# Patient Record
Sex: FEMALE | Race: WHITE | NOT HISPANIC OR LATINO | Employment: UNEMPLOYED | URBAN - METROPOLITAN AREA
[De-identification: names, ages, dates, MRNs, and addresses within clinical notes are randomized per-mention and may not be internally consistent; named-entity substitution may affect disease eponyms.]

---

## 2019-06-03 ENCOUNTER — HOSPITAL ENCOUNTER (OUTPATIENT)
Dept: GENERAL RADIOLOGY | Facility: HOSPITAL | Age: 59
Discharge: HOME | End: 2019-06-03
Admitting: ORTHOPAEDIC SURGERY

## 2019-06-03 ENCOUNTER — APPOINTMENT (OUTPATIENT)
Dept: PREADMISSION TESTING | Facility: HOSPITAL | Age: 59
End: 2019-06-03

## 2019-06-03 VITALS — HEIGHT: 62 IN | WEIGHT: 231.26 LBS | BODY MASS INDEX: 42.56 KG/M2

## 2019-06-03 LAB
ALBUMIN SERPL-MCNC: 4.1 G/DL (ref 3.5–5.2)
ALBUMIN/GLOB SERPL: 1.4 G/DL
ALP SERPL-CCNC: 108 U/L (ref 39–117)
ALT SERPL W P-5'-P-CCNC: 41 U/L (ref 1–33)
ANION GAP SERPL CALCULATED.3IONS-SCNC: 10 MMOL/L
APTT PPP: 27.1 SECONDS (ref 24–37)
AST SERPL-CCNC: 25 U/L (ref 1–32)
BACTERIA UR QL AUTO: NORMAL /HPF
BASOPHILS # BLD AUTO: 0.03 10*3/MM3 (ref 0–0.2)
BASOPHILS NFR BLD AUTO: 0.3 % (ref 0–1.5)
BILIRUB SERPL-MCNC: 0.3 MG/DL (ref 0.2–1.2)
BILIRUB UR QL STRIP: NEGATIVE
BUN BLD-MCNC: 14 MG/DL (ref 8–23)
BUN/CREAT SERPL: 23.7 (ref 7–25)
CALCIUM SPEC-SCNC: 9.5 MG/DL (ref 8.6–10.5)
CHLORIDE SERPL-SCNC: 104 MMOL/L (ref 98–107)
CLARITY UR: CLEAR
CO2 SERPL-SCNC: 25 MMOL/L (ref 22–29)
COLOR UR: YELLOW
CREAT BLD-MCNC: 0.59 MG/DL (ref 0.57–1)
DEPRECATED RDW RBC AUTO: 40.8 FL (ref 37–54)
EOSINOPHIL # BLD AUTO: 0.22 10*3/MM3 (ref 0–0.4)
EOSINOPHIL NFR BLD AUTO: 2.5 % (ref 0.3–6.2)
ERYTHROCYTE [DISTWIDTH] IN BLOOD BY AUTOMATED COUNT: 13 % (ref 12.3–15.4)
GFR SERPL CREATININE-BSD FRML MDRD: 104 ML/MIN/1.73
GLOBULIN UR ELPH-MCNC: 2.9 GM/DL
GLUCOSE BLD-MCNC: 120 MG/DL (ref 65–99)
GLUCOSE UR STRIP-MCNC: NEGATIVE MG/DL
HCT VFR BLD AUTO: 37.8 % (ref 34–46.6)
HGB BLD-MCNC: 13.1 G/DL (ref 12–15.9)
HGB UR QL STRIP.AUTO: NEGATIVE
HYALINE CASTS UR QL AUTO: NORMAL /LPF
IMM GRANULOCYTES # BLD AUTO: 0.05 10*3/MM3 (ref 0–0.05)
IMM GRANULOCYTES NFR BLD AUTO: 0.6 % (ref 0–0.5)
INR PPP: 0.91 (ref 0.85–1.16)
KETONES UR QL STRIP: NEGATIVE
LEUKOCYTE ESTERASE UR QL STRIP.AUTO: ABNORMAL
LYMPHOCYTES # BLD AUTO: 2.59 10*3/MM3 (ref 0.7–3.1)
LYMPHOCYTES NFR BLD AUTO: 29 % (ref 19.6–45.3)
MCH RBC QN AUTO: 29.8 PG (ref 26.6–33)
MCHC RBC AUTO-ENTMCNC: 34.7 G/DL (ref 31.5–35.7)
MCV RBC AUTO: 86.1 FL (ref 79–97)
MONOCYTES # BLD AUTO: 0.57 10*3/MM3 (ref 0.1–0.9)
MONOCYTES NFR BLD AUTO: 6.4 % (ref 5–12)
NEUTROPHILS # BLD AUTO: 5.53 10*3/MM3 (ref 1.7–7)
NEUTROPHILS NFR BLD AUTO: 61.8 % (ref 42.7–76)
NITRITE UR QL STRIP: NEGATIVE
PH UR STRIP.AUTO: 7 [PH] (ref 5–8)
PLATELET # BLD AUTO: 256 10*3/MM3 (ref 140–450)
PMV BLD AUTO: 10.4 FL (ref 6–12)
POTASSIUM BLD-SCNC: 4.5 MMOL/L (ref 3.5–5.2)
PROT SERPL-MCNC: 7 G/DL (ref 6–8.5)
PROT UR QL STRIP: NEGATIVE
PROTHROMBIN TIME: 11.8 SECONDS (ref 11.2–14.3)
RBC # BLD AUTO: 4.39 10*6/MM3 (ref 3.77–5.28)
RBC # UR: NORMAL /HPF
REF LAB TEST METHOD: NORMAL
SODIUM BLD-SCNC: 139 MMOL/L (ref 136–145)
SP GR UR STRIP: 1.01 (ref 1–1.03)
SQUAMOUS #/AREA URNS HPF: NORMAL /HPF
UROBILINOGEN UR QL STRIP: ABNORMAL
WBC NRBC COR # BLD: 8.94 10*3/MM3 (ref 3.4–10.8)
WBC UR QL AUTO: NORMAL /HPF

## 2019-06-03 PROCEDURE — 36415 COLL VENOUS BLD VENIPUNCTURE: CPT

## 2019-06-03 PROCEDURE — 71046 X-RAY EXAM CHEST 2 VIEWS: CPT

## 2019-06-03 PROCEDURE — 81001 URINALYSIS AUTO W/SCOPE: CPT | Performed by: ORTHOPAEDIC SURGERY

## 2019-06-03 PROCEDURE — 87086 URINE CULTURE/COLONY COUNT: CPT | Performed by: ORTHOPAEDIC SURGERY

## 2019-06-03 PROCEDURE — 85610 PROTHROMBIN TIME: CPT | Performed by: ORTHOPAEDIC SURGERY

## 2019-06-03 PROCEDURE — 93010 ELECTROCARDIOGRAM REPORT: CPT | Performed by: INTERNAL MEDICINE

## 2019-06-03 PROCEDURE — 85025 COMPLETE CBC W/AUTO DIFF WBC: CPT | Performed by: ORTHOPAEDIC SURGERY

## 2019-06-03 PROCEDURE — 80053 COMPREHEN METABOLIC PANEL: CPT | Performed by: ORTHOPAEDIC SURGERY

## 2019-06-03 PROCEDURE — 93005 ELECTROCARDIOGRAM TRACING: CPT

## 2019-06-03 PROCEDURE — 85730 THROMBOPLASTIN TIME PARTIAL: CPT | Performed by: ORTHOPAEDIC SURGERY

## 2019-06-03 RX ORDER — ALBUTEROL SULFATE 90 UG/1
2 AEROSOL, METERED RESPIRATORY (INHALATION) EVERY 4 HOURS PRN
COMMUNITY

## 2019-06-03 RX ORDER — OMEPRAZOLE 40 MG/1
40 CAPSULE, DELAYED RELEASE ORAL DAILY
COMMUNITY

## 2019-06-03 RX ORDER — RANOLAZINE 500 MG/1
500 TABLET, EXTENDED RELEASE ORAL 2 TIMES DAILY PRN
COMMUNITY

## 2019-06-03 NOTE — PAT
Patient instructed to drink 20 ounces (or until full) of Gatorade or 20 ounces of G2 (if diabetic) and complete 1 hour before arrival time for procedure (NO RED Gatorade or G2)    Patient verbalized understanding.    Patient given a prescription for Benzol Peroxide 5% wash during PAT visit.  Instructed them to fill the prescription or  from [facility] pharmacy if was submitted electronically by the surgeon's office.  Verbal and written instructions given regarding proper use of the Benzoyl Peroxide wash given to patient and/or famlily during PAT visit. Patient/family also instructed to complete checklist and return it to Pre-op on the day of surgery.  Patient and/or family verbalized understanding.      Additionally, reinforced with patient to acquire this prescription from the [facility] retail pharmacy before leaving the hospital after PAT visit due to the potential unavailability at local pharmacies.    Per Anesthesia Request, patient instructed not to take their ACE/ARB medications on the AM of surgery.

## 2019-06-04 LAB — BACTERIA SPEC AEROBE CULT: NORMAL

## 2019-06-05 NOTE — PAT
Notified Marianne at [facility]'s office that culture indicates needing a recollect.  Marianne sending to [facility].

## 2019-06-12 ENCOUNTER — ANESTHESIA EVENT (OUTPATIENT)
Dept: PERIOP | Facility: HOSPITAL | Age: 59
End: 2019-06-12

## 2019-06-12 RX ORDER — FAMOTIDINE 10 MG/ML
20 INJECTION, SOLUTION INTRAVENOUS ONCE
Status: CANCELLED | OUTPATIENT
Start: 2019-06-12 | End: 2019-06-12

## 2019-06-13 ENCOUNTER — ANESTHESIA (OUTPATIENT)
Dept: PERIOP | Facility: HOSPITAL | Age: 59
End: 2019-06-13

## 2019-06-13 ENCOUNTER — HOSPITAL ENCOUNTER (OUTPATIENT)
Facility: HOSPITAL | Age: 59
Discharge: HOME | End: 2019-06-13
Attending: ORTHOPAEDIC SURGERY | Admitting: ORTHOPAEDIC SURGERY

## 2019-06-13 VITALS
BODY MASS INDEX: 42.51 KG/M2 | SYSTOLIC BLOOD PRESSURE: 111 MMHG | WEIGHT: 231 LBS | HEART RATE: 67 BPM | HEIGHT: 62 IN | RESPIRATION RATE: 18 BRPM | TEMPERATURE: 97.7 F | DIASTOLIC BLOOD PRESSURE: 55 MMHG | OXYGEN SATURATION: 97 %

## 2019-06-13 PROBLEM — M25.512 LEFT SHOULDER PAIN: Status: ACTIVE | Noted: 2019-06-13

## 2019-06-13 LAB — POTASSIUM BLD-SCNC: 3.8 MMOL/L (ref 3.5–5.2)

## 2019-06-13 PROCEDURE — 25010000002 PROPOFOL 10 MG/ML EMULSION: Performed by: NURSE ANESTHETIST, CERTIFIED REGISTERED

## 2019-06-13 PROCEDURE — 25010000003 CEFAZOLIN IN DEXTROSE 2-4 GM/100ML-% SOLUTION: Performed by: ORTHOPAEDIC SURGERY

## 2019-06-13 PROCEDURE — 25010000002 FENTANYL CITRATE (PF) 100 MCG/2ML SOLUTION: Performed by: NURSE ANESTHETIST, CERTIFIED REGISTERED

## 2019-06-13 PROCEDURE — 25010000002 NEOSTIGMINE 10 MG/10ML SOLUTION: Performed by: NURSE ANESTHETIST, CERTIFIED REGISTERED

## 2019-06-13 PROCEDURE — 25010000002 ONDANSETRON PER 1 MG: Performed by: NURSE ANESTHETIST, CERTIFIED REGISTERED

## 2019-06-13 PROCEDURE — 84132 ASSAY OF SERUM POTASSIUM: CPT | Performed by: ANESTHESIOLOGY

## 2019-06-13 PROCEDURE — 25010000002 MIDAZOLAM PER 1 MG: Performed by: ANESTHESIOLOGY

## 2019-06-13 PROCEDURE — 25010000002 PHENYLEPHRINE PER 1 ML: Performed by: NURSE ANESTHETIST, CERTIFIED REGISTERED

## 2019-06-13 PROCEDURE — L3670 SO ACRO/CLAV CAN WEB PRE OTS: HCPCS | Performed by: ORTHOPAEDIC SURGERY

## 2019-06-13 PROCEDURE — 25010000002 FENTANYL CITRATE (PF) 100 MCG/2ML SOLUTION: Performed by: ANESTHESIOLOGY

## 2019-06-13 PROCEDURE — 25010000002 EPINEPHRINE PER 0.1 MG: Performed by: ORTHOPAEDIC SURGERY

## 2019-06-13 PROCEDURE — 25010000002 DEXAMETHASONE PER 1 MG: Performed by: NURSE ANESTHETIST, CERTIFIED REGISTERED

## 2019-06-13 PROCEDURE — C1713 ANCHOR/SCREW BN/BN,TIS/BN: HCPCS | Performed by: ORTHOPAEDIC SURGERY

## 2019-06-13 DEVICE — SYS SUT/ANCH BIOCOMP SPEEDBRIDGE 4.85 12.5: Type: IMPLANTABLE DEVICE | Site: SHOULDER | Status: FUNCTIONAL

## 2019-06-13 RX ORDER — SODIUM CHLORIDE, SODIUM LACTATE, POTASSIUM CHLORIDE, AND CALCIUM CHLORIDE .6; .31; .03; .02 G/100ML; G/100ML; G/100ML; G/100ML
IRRIGANT IRRIGATION AS NEEDED
Status: DISCONTINUED | OUTPATIENT
Start: 2019-06-13 | End: 2019-06-13 | Stop reason: HOSPADM

## 2019-06-13 RX ORDER — HYDROMORPHONE HYDROCHLORIDE 2 MG/1
2 TABLET ORAL
Qty: 50 TABLET | Refills: 0 | Status: SHIPPED | OUTPATIENT
Start: 2019-06-13

## 2019-06-13 RX ORDER — NEOSTIGMINE METHYLSULFATE 1 MG/ML
INJECTION, SOLUTION INTRAVENOUS AS NEEDED
Status: DISCONTINUED | OUTPATIENT
Start: 2019-06-13 | End: 2019-06-13 | Stop reason: SURG

## 2019-06-13 RX ORDER — ONDANSETRON 2 MG/ML
4 INJECTION INTRAMUSCULAR; INTRAVENOUS ONCE AS NEEDED
Status: DISCONTINUED | OUTPATIENT
Start: 2019-06-13 | End: 2019-06-14 | Stop reason: HOSPADM

## 2019-06-13 RX ORDER — SODIUM CHLORIDE 0.9 % (FLUSH) 0.9 %
3-10 SYRINGE (ML) INJECTION AS NEEDED
Status: DISCONTINUED | OUTPATIENT
Start: 2019-06-13 | End: 2019-06-13 | Stop reason: HOSPADM

## 2019-06-13 RX ORDER — GLYCOPYRROLATE 0.2 MG/ML
INJECTION INTRAMUSCULAR; INTRAVENOUS AS NEEDED
Status: DISCONTINUED | OUTPATIENT
Start: 2019-06-13 | End: 2019-06-13 | Stop reason: SURG

## 2019-06-13 RX ORDER — BUPIVACAINE HYDROCHLORIDE 2.5 MG/ML
INJECTION, SOLUTION EPIDURAL; INFILTRATION; INTRACAUDAL
Status: COMPLETED | OUTPATIENT
Start: 2019-06-13 | End: 2019-06-13

## 2019-06-13 RX ORDER — PREGABALIN 75 MG/1
75 CAPSULE ORAL ONCE
Status: COMPLETED | OUTPATIENT
Start: 2019-06-13 | End: 2019-06-13

## 2019-06-13 RX ORDER — FENTANYL CITRATE 50 UG/ML
50 INJECTION, SOLUTION INTRAMUSCULAR; INTRAVENOUS
Status: DISCONTINUED | OUTPATIENT
Start: 2019-06-13 | End: 2019-06-14 | Stop reason: HOSPADM

## 2019-06-13 RX ORDER — FENTANYL CITRATE 50 UG/ML
INJECTION, SOLUTION INTRAMUSCULAR; INTRAVENOUS
Status: COMPLETED | OUTPATIENT
Start: 2019-06-13 | End: 2019-06-13

## 2019-06-13 RX ORDER — DEXAMETHASONE SODIUM PHOSPHATE 4 MG/ML
INJECTION, SOLUTION INTRA-ARTICULAR; INTRALESIONAL; INTRAMUSCULAR; INTRAVENOUS; SOFT TISSUE AS NEEDED
Status: DISCONTINUED | OUTPATIENT
Start: 2019-06-13 | End: 2019-06-13 | Stop reason: SURG

## 2019-06-13 RX ORDER — FAMOTIDINE 20 MG/1
20 TABLET, FILM COATED ORAL ONCE
Status: COMPLETED | OUTPATIENT
Start: 2019-06-13 | End: 2019-06-13

## 2019-06-13 RX ORDER — LIDOCAINE HYDROCHLORIDE 10 MG/ML
0.5 INJECTION, SOLUTION EPIDURAL; INFILTRATION; INTRACAUDAL; PERINEURAL ONCE AS NEEDED
Status: COMPLETED | OUTPATIENT
Start: 2019-06-13 | End: 2019-06-13

## 2019-06-13 RX ORDER — MIDAZOLAM HYDROCHLORIDE 1 MG/ML
INJECTION INTRAMUSCULAR; INTRAVENOUS
Status: COMPLETED | OUTPATIENT
Start: 2019-06-13 | End: 2019-06-13

## 2019-06-13 RX ORDER — LIDOCAINE HYDROCHLORIDE 10 MG/ML
INJECTION, SOLUTION EPIDURAL; INFILTRATION; INTRACAUDAL; PERINEURAL AS NEEDED
Status: DISCONTINUED | OUTPATIENT
Start: 2019-06-13 | End: 2019-06-13 | Stop reason: SURG

## 2019-06-13 RX ORDER — SODIUM CHLORIDE 0.9 % (FLUSH) 0.9 %
3 SYRINGE (ML) INJECTION EVERY 12 HOURS SCHEDULED
Status: DISCONTINUED | OUTPATIENT
Start: 2019-06-13 | End: 2019-06-13 | Stop reason: HOSPADM

## 2019-06-13 RX ORDER — ATRACURIUM BESYLATE 10 MG/ML
INJECTION, SOLUTION INTRAVENOUS AS NEEDED
Status: DISCONTINUED | OUTPATIENT
Start: 2019-06-13 | End: 2019-06-13 | Stop reason: SURG

## 2019-06-13 RX ORDER — PROPOFOL 10 MG/ML
VIAL (ML) INTRAVENOUS AS NEEDED
Status: DISCONTINUED | OUTPATIENT
Start: 2019-06-13 | End: 2019-06-13 | Stop reason: SURG

## 2019-06-13 RX ORDER — ROPIVACAINE HYDROCHLORIDE 2 MG/ML
6 INJECTION, SOLUTION EPIDURAL; INFILTRATION CONTINUOUS
Status: DISCONTINUED | OUTPATIENT
Start: 2019-06-13 | End: 2019-06-13

## 2019-06-13 RX ORDER — ONDANSETRON 2 MG/ML
INJECTION INTRAMUSCULAR; INTRAVENOUS AS NEEDED
Status: DISCONTINUED | OUTPATIENT
Start: 2019-06-13 | End: 2019-06-13 | Stop reason: SURG

## 2019-06-13 RX ORDER — ACETAMINOPHEN 500 MG
1000 TABLET ORAL ONCE
Status: COMPLETED | OUTPATIENT
Start: 2019-06-13 | End: 2019-06-13

## 2019-06-13 RX ORDER — CEFAZOLIN SODIUM 2 G/100ML
2 INJECTION, SOLUTION INTRAVENOUS ONCE
Status: COMPLETED | OUTPATIENT
Start: 2019-06-13 | End: 2019-06-13

## 2019-06-13 RX ORDER — SODIUM CHLORIDE, SODIUM LACTATE, POTASSIUM CHLORIDE, CALCIUM CHLORIDE 600; 310; 30; 20 MG/100ML; MG/100ML; MG/100ML; MG/100ML
9 INJECTION, SOLUTION INTRAVENOUS CONTINUOUS
Status: DISCONTINUED | OUTPATIENT
Start: 2019-06-13 | End: 2019-06-14 | Stop reason: HOSPADM

## 2019-06-13 RX ADMIN — GLYCOPYRROLATE 0.4 MG: 0.2 INJECTION, SOLUTION INTRAMUSCULAR; INTRAVENOUS at 16:58

## 2019-06-13 RX ADMIN — PROPOFOL 150 MG: 10 INJECTION, EMULSION INTRAVENOUS at 15:23

## 2019-06-13 RX ADMIN — BUPIVACAINE HYDROCHLORIDE 20 ML: 2.5 INJECTION, SOLUTION EPIDURAL; INFILTRATION; INTRACAUDAL; PERINEURAL at 12:34

## 2019-06-13 RX ADMIN — EPHEDRINE SULFATE 10 MG: 50 INJECTION INTRAMUSCULAR; INTRAVENOUS; SUBCUTANEOUS at 15:50

## 2019-06-13 RX ADMIN — SODIUM CHLORIDE, POTASSIUM CHLORIDE, SODIUM LACTATE AND CALCIUM CHLORIDE: 600; 310; 30; 20 INJECTION, SOLUTION INTRAVENOUS at 16:45

## 2019-06-13 RX ADMIN — LIDOCAINE HYDROCHLORIDE 50 MG: 10 INJECTION, SOLUTION EPIDURAL; INFILTRATION; INTRACAUDAL; PERINEURAL at 15:23

## 2019-06-13 RX ADMIN — CEFAZOLIN SODIUM 2 G: 2 INJECTION, SOLUTION INTRAVENOUS at 15:12

## 2019-06-13 RX ADMIN — LIDOCAINE HYDROCHLORIDE 0.5 ML: 10 INJECTION, SOLUTION EPIDURAL; INFILTRATION; INTRACAUDAL; PERINEURAL at 12:22

## 2019-06-13 RX ADMIN — FENTANYL CITRATE 50 MCG: 50 INJECTION INTRAMUSCULAR; INTRAVENOUS at 17:35

## 2019-06-13 RX ADMIN — PHENYLEPHRINE HYDROCHLORIDE 100 MCG: 10 INJECTION INTRAVENOUS at 15:47

## 2019-06-13 RX ADMIN — PREGABALIN 75 MG: 75 CAPSULE ORAL at 12:21

## 2019-06-13 RX ADMIN — EPHEDRINE SULFATE 10 MG: 50 INJECTION INTRAMUSCULAR; INTRAVENOUS; SUBCUTANEOUS at 15:58

## 2019-06-13 RX ADMIN — ONDANSETRON 4 MG: 2 INJECTION INTRAMUSCULAR; INTRAVENOUS at 16:48

## 2019-06-13 RX ADMIN — FAMOTIDINE 20 MG: 20 TABLET, FILM COATED ORAL at 12:21

## 2019-06-13 RX ADMIN — FENTANYL CITRATE 100 MCG: 50 INJECTION, SOLUTION INTRAMUSCULAR; INTRAVENOUS at 12:34

## 2019-06-13 RX ADMIN — ATRACURIUM BESYLATE 40 MG: 10 INJECTION, SOLUTION INTRAVENOUS at 15:23

## 2019-06-13 RX ADMIN — MIDAZOLAM HYDROCHLORIDE 2 MG: 1 INJECTION, SOLUTION INTRAMUSCULAR; INTRAVENOUS at 12:34

## 2019-06-13 RX ADMIN — PHENYLEPHRINE HYDROCHLORIDE 100 MCG: 10 INJECTION INTRAVENOUS at 15:33

## 2019-06-13 RX ADMIN — DEXAMETHASONE SODIUM PHOSPHATE 8 MG: 4 INJECTION, SOLUTION INTRAMUSCULAR; INTRAVENOUS at 15:23

## 2019-06-13 RX ADMIN — PHENYLEPHRINE HYDROCHLORIDE 100 MCG: 10 INJECTION INTRAVENOUS at 16:45

## 2019-06-13 RX ADMIN — EPHEDRINE SULFATE 10 MG: 50 INJECTION INTRAMUSCULAR; INTRAVENOUS; SUBCUTANEOUS at 16:06

## 2019-06-13 RX ADMIN — SODIUM CHLORIDE, POTASSIUM CHLORIDE, SODIUM LACTATE AND CALCIUM CHLORIDE 9 ML/HR: 600; 310; 30; 20 INJECTION, SOLUTION INTRAVENOUS at 12:21

## 2019-06-13 RX ADMIN — ACETAMINOPHEN 1000 MG: 500 TABLET ORAL at 12:21

## 2019-06-13 RX ADMIN — SODIUM CHLORIDE, POTASSIUM CHLORIDE, SODIUM LACTATE AND CALCIUM CHLORIDE: 600; 310; 30; 20 INJECTION, SOLUTION INTRAVENOUS at 15:12

## 2019-06-13 RX ADMIN — NEOSTIGMINE METHYLSULFATE 3 MG: 1 INJECTION, SOLUTION INTRAVENOUS at 16:58

## 2019-06-13 RX ADMIN — PHENYLEPHRINE HYDROCHLORIDE 100 MCG: 10 INJECTION INTRAVENOUS at 15:40

## 2019-06-13 NOTE — ANESTHESIA PREPROCEDURE EVALUATION
Anesthesia Evaluation     Patient summary reviewed and Nursing notes reviewed   no history of anesthetic complications:  NPO Solid Status: > 8 hours  NPO Liquid Status: > 8 hours           Airway   Mallampati: II  TM distance: >3 FB  Neck ROM: full  No difficulty expected  Dental      Pulmonary - normal exam   (+) asthma,   Cardiovascular - normal exam    (+) hypertension,       Neuro/Psych  (+) dizziness/light headedness,     GI/Hepatic/Renal/Endo    (+)  GERD,      Musculoskeletal     Abdominal    Substance History      OB/GYN          Other                        Anesthesia Plan    ASA 2     general   (Block for post op pain)  intravenous induction   Anesthetic plan, all risks, benefits, and alternatives have been provided, discussed and informed consent has been obtained with: patient.    Plan discussed with CRNA.

## 2019-06-13 NOTE — DISCHARGE INSTRUCTIONS
1) sling at all times except for bathing dressing and changing only-- when out of sling for these activities please keep your arm by your side (elbow near side with hand near the abdomen-- similar to sling position)  2) bandages may be removed POD #3 (72 hours from time of surgery) and incisions may be left open to air  3) you may shower starting 72 hours after surgery-- until then please keep bandages in place and dry

## 2019-06-13 NOTE — ANESTHESIA PROCEDURE NOTES
Peripheral Block      Patient location during procedure: pre-op  Reason for block: at surgeon's request and post-op pain management  Performed by  Anesthesiologist: Bruce Heredia MD  Preanesthetic Checklist  Completed: patient identified, site marked, surgical consent, pre-op evaluation, timeout performed, IV checked, risks and benefits discussed and monitors and equipment checked  Prep:  Sterile barriers:cap, gloves, mask and sterile barriers  Prep: ChloraPrep  Patient monitoring: blood pressure monitoring, continuous pulse oximetry and EKG  Procedure  Sedation:yes    Guidance:ultrasound guided  Images:still images obtained    Block Type:interscalene  Injection Technique:catheter  Needle Type:Tuohy and echogenic  Needle Gauge:18 G    Catheter Size:20 G (20g)    Medications Used: bupivacaine PF (MARCAINE) 0.25 % injection, 20 mL  midazolam (VERSED) injection, 2 mg  fentaNYL citrate (PF) (SUBLIMAZE) injection, 100 mcg      Post Assessment  Injection Assessment: negative aspiration for heme, no paresthesia on injection and incremental injection  Patient Tolerance:comfortable throughout block  Complications:no  Additional Notes  Procedure:                 The pt was placed in semifowlers position with a slight tilt of the thorax contralateral to the insertion site.  The Insertion Site was prepped and draped in sterile fashion.  The pt was anesthetized with  IV Sedation( see meds) and  Skin and cutaneous tissue was infiltrated and anesthetized with 1% Lidocaine 3 mls via a 25g needle.  Utilizing ultrasound guidance, a BBraun 2 inch 18 g Contiplex echogenic touhy needle was advanced in-plane.  Hydro dissection of tissue was achieved with Normal saline. Major vessels(carotid and Internal Jugular) where visualized as the brachial plexus was approached at the approximate level of C-7/ T-1.  Cervical 5 and Branches of Cervical 6 nerve roots where visualized and the needle tip was placed posterior at the level of C-6  roots.  LA spread was visualized and injection was made incrementally every 5 mls with aspiration. Injection pressure was normal or little, there was no intraneural injection, no vascular injection.      The BBraun 20 g wire stylet  catheter was then placed under US guidance on the posterior aspect of the Brachial Plexus.  Location of catheter was confirmed with NS injection visualized with US . The tuohy was then removed and the skin was sealed with Skin AFix at catheter insertion site.  Skin was prepped with mastisol and the labeled catheter  was secured with steristrips and a CHG tegaderm. Thank You.

## 2019-06-13 NOTE — H&P
"Pre-Op H&P  Isabela Lepe  [id number]  1960    Chief complaint: left shoulder pain    HPI:    Patient is a 59 y.o.female who presents with history of pain in the left shoulder sustained from a left rotator cuff tear.     Review of Systems:  General ROS: negative for chills, fever or skin lesions;  No changes since last office visit  Cardiovascular ROS: no chest pain or dyspnea on exertion  Respiratory ROS: no cough, shortness of breath, or wheezing    Allergies:   Allergies   Allergen Reactions   • Ciprofloxacin Anaphylaxis   • Codeine Itching       Home Meds:    No current facility-administered medications on file prior to encounter.      No current outpatient medications on file prior to encounter.       PMH:   Past Medical History:   Diagnosis Date   • Asthma    • Broken tooth     top tooth and left    • Burn     right knee    • Dizzy    • GERD (gastroesophageal reflux disease)    • Hypertension    • Tinnitus    • Vertigo    • Wears glasses      PSH:    Past Surgical History:   Procedure Laterality Date   • CHOLECYSTECTOMY     • COLONOSCOPY     • ENDOSCOPY     • FOOT SURGERY      right - bone removal    • HYSTERECTOMY      total    • KNEE ARTHROSCOPY      left   • SHOULDER SURGERY      right    • WISDOM TOOTH EXTRACTION      only 3 removed and pt only had 3        Immunization History:  Influenza: 2018  Pneumococcal: 2018  Tetanus: <10 years    Social History:   Tobacco:   Social History     Tobacco Use   Smoking Status Former Smoker   • Packs/day: 1.00   • Years: 8.00   • Pack years: 8.00   • Types: Cigarettes   Smokeless Tobacco Never Used   Tobacco Comment    25 years ago about quit       Alcohol:     Social History     Substance and Sexual Activity   Alcohol Use No   • Frequency: Never       Vitals:           /68 (BP Location: Right arm, Patient Position: Lying)   Pulse 78   Temp 97.9 °F (36.6 °C) (Temporal)   Resp 18   Ht 157.5 cm (62\")   Wt 105 kg (231 lb)   SpO2 98%   BMI 42.25 kg/m² "     Physical Exam:  General Appearance:    Alert, cooperative, no distress, appears stated age   Head:    Normocephalic, without obvious abnormality, atraumatic   Lungs:     Clear to auscultation bilaterally, respirations unlabored    Heart:   Regular rate and rhythm, S1 and S2 normal, no murmur, rub    or gallop    Abdomen:    Soft, non-tender.  +bowel sounds   Breast Exam:    deferred   Genitalia:    deferred   Extremities:   Extremities normal, atraumatic, no cyanosis or edema   Skin:   Skin color, texture, turgor normal, no rashes or lesions   Neurologic:   Grossly intact   Results Review  LABS:  Lab Results   Component Value Date    WBC 8.94 06/03/2019    HGB 13.1 06/03/2019    HCT 37.8 06/03/2019    MCV 86.1 06/03/2019     06/03/2019    NEUTROABS 5.53 06/03/2019    GLUCOSE 120 (H) 06/03/2019    BUN 14 06/03/2019    CREATININE 0.59 06/03/2019    EGFRIFNONA 104 06/03/2019     06/03/2019    K 3.8 06/13/2019     06/03/2019    CO2 25.0 06/03/2019    CALCIUM 9.5 06/03/2019    ALBUMIN 4.10 06/03/2019    AST 25 06/03/2019    ALT 41 (H) 06/03/2019    BILITOT 0.3 06/03/2019         Cancer Staging (if applicable)  Cancer Patient: __ yes _x_no __unknown; If yes, clinical stage T:__ N:__M:__, stage group or __N/A    Impression: Left rotator cuff tear    Plan: Left shoulder scope with rotator cuff repair       Paulo Cole PA-C   6/13/2019   1:17 PM

## 2019-06-13 NOTE — OP NOTE
Preoperative Diagnosis: L Rotator cuff tear    Postoperative Diagnosis: L Rotator cuff tear, long head of biceps tenosynovitis with split tear    Procedure:   1) L shoulder arthroscopy with extensive debridement ant-post  2) L shoulder arthroscopic biceps tenotomy  3) L shoulder arthroscopic subacromial decompression with acromioplasty  4) L shoulder arthroscopic rotator cuff repair    Surgeon: Dr. Haja Abbasi    Assistant: Raisa Morton PA-C  ** Please note the physician assistant was medically necessary to assist with positioning retraction, arm positioning, care of soft tissues and closure    EBL:   10 cc    Anesthesia: GETA with interscalene brachial plexus block    Implants:  1) Arthrex speed bridge Rotator cuff repair system      Findings:  1) Glenoid:no chondral change  2) Humerus:no chondral change  3) Labrum: fraying anteriorly and posteriorly  4) Biceps:split tear approx 20-30 % with tenosynovitis  5) Rotator Cuff:  a. Subscapularis: intact  b. Supraspinatus: 1.6cm full thickness tear with no significant retraction  c. Infraspinatus: intact  d. Teres Minor: intact  6) Axillary Pouch: negative        Indications: ___Patient__   is a 60yo F who was seen in the orthopaedic clinic and after clinical and radiographic evaluation was diagnosed with a rotator cuff tear with possible long head of biceps tendon tear.  Risks and benefits of operative versus nonoperative management discussed.  Patient elected to proceed with surgery. Informed consent obtained.       Description: Patient was met in the preoperative holding area and confirmed by name, medical record number, .  The operative site was correctly identified. Patient was then met by anesthesia and cleared for surgery.  An interscalene brachial plexus block was then performed.  Patient was then brought to the operating room suite and and placed supine on the OR table.  Patient underwent smooth induction with GETA.  Patient then positioned in the  beach chair position. Patient’s L shoulder and L upper extremity prepped and draped in sterile fashion. Preoperative prophylactic antibiotic given with _2_ g of __Ancef___.  A timeout was then observed    A standard posterior viewing portal was then established.  A diagnostic scope of the intra-articular space was performed with the findings listed above.  A standard rotator interval portal was then made.  A shaver was then inserted and the rotator interval was opened further.  Fraying of the anterior and posterior labrum was then debrided and an arthroscopic scissor was used to tenotomize the long head of the biceps tendon, care was taken to ensure the biceps tendon retracted distally out of the intra-articular space.      Visualization of the subacromial space was then confirmed, a spinal needle was used to make a lateral portal and a combination of a shaver and cautery were used to debride the subacromial bursa and visualize the rotator cuff tear, the characteristics of the tear are listed above.  A shaver and kalani were used to decorticate the rotator cuff footprint.  Arthrex  suture anchors were placed at the articular margin as a medial row of fixation, and sutures were then passed with a suture passing device. Sutures from the medial row were then placed through Arthrex biocomposite swivel lock suture anchors (4.75mm) and these lateral row anchors placed at the proximal extent of the subdeltoid recess, in a double row fashion.     A kalani was then used to perform a limited acromioplasty of a small anterolateral subacromial spur.         We then withdrew our arthroscopic instrumentation and closed the skin with interrupted 3-0 nylon.  A sterile dressing was applied.  A sling with abduction pillow was applied    Patient tolerated the procedure well. S He was extubated and placed in a sling.  At the end of the case all sponge and instrument counts were correct x 2.      Plan: Patient will be seen back at approx 10  days postop. Rehab  Small to medium rotator cuff repair protocol

## 2019-06-13 NOTE — ANESTHESIA POSTPROCEDURE EVALUATION
Patient: Isabela Lepe    Procedure Summary     Date:  06/13/19 Room / Location:   [facility]    Anesthesia Start:  1512 Anesthesia Stop:      Procedure:  SHOULDER SCOPE WITH REPAIR AND BICEPS TENOTOMY LEFT (Left Shoulder) Diagnosis:       Rotator cuff tear, left      (rotator cuff tear left shoulder)    Surgeon:  Haja Abbasi MD Provider:  Bruce Heredia MD    Anesthesia Type:  general ASA Status:  2          Anesthesia Type: general  Last vitals  /56  140/68 (06/13/19 1226)   Temp 97  97.9 °F (36.6 °C) (06/13/19 1226)   Pulse 81  78 (06/13/19 1226)   Resp 18  18 (06/13/19 1226)     SpO2 98  98 % (06/13/19 1226)     Post Anesthesia Care and Evaluation    Patient location during evaluation: PACU  Patient participation: complete - patient participated  Level of consciousness: awake and alert  Pain score: 0  Pain management: adequate  Airway patency: patent  Anesthetic complications: No anesthetic complications  PONV Status: none  Cardiovascular status: hemodynamically stable and acceptable  Respiratory status: nonlabored ventilation, acceptable and nasal cannula  Hydration status: acceptable

## 2019-06-13 NOTE — NURSING NOTE
Acute Pain Service:  Peripheral nerve catheter and disposable infusion device teaching completed with patient and spouse.  Discussion, handout and bracelet provided with [facility] on call central phone number.  Instructed to call with any questions or concerns.  Patient verbalized understanding.  Service will continue to follow until catheter DC'd.  Please contact patient at [phone] if needed.

## 2019-06-14 NOTE — PROGRESS NOTES
[facility]    Nerve Cath Post Op Call    Patient Name: Isabela Lepe  :  1960  MRN:  [id number]  Date of Discharge: 2019    Nerve Cath Post Op Call:    Analgesia:Excellent  Pain Score:0/10  Side Effects:None  Catheter Plan:The patient was instructed to call ON CALL Anesthesia provider for any questions or problems and Patient/Family member report nerve catheter previously discontinued, tip intact      The patient accidentally removed her nerve catheter, the tip is intact.  I counseled her to take her prescribed opioids once the block begins to wear off.

## 2025-02-06 ENCOUNTER — TELEPHONE (OUTPATIENT)
Age: 65
End: 2025-02-06
Payer: OTHER GOVERNMENT

## 2025-02-06 NOTE — TELEPHONE ENCOUNTER
RECEIVED A VOICEMAIL FROM ANAMARIA AT [facility] REQUESTING CARDIAC CLEARANCE AND PERMISSION TO STOP XARELTO.

## 2025-02-07 ENCOUNTER — TELEPHONE (OUTPATIENT)
Age: 65
End: 2025-02-07
Payer: OTHER GOVERNMENT

## 2025-02-07 ENCOUNTER — TELEPHONE (OUTPATIENT)
Age: 65
End: 2025-02-07

## 2025-02-07 NOTE — TELEPHONE ENCOUNTER
CALLED PT AND SHE STATED NEEDED A  SURGICAL CLEARANCE FOR [facility] MCKENNA FOR LEFT KNEE REPLACEMENT ON 2/25/25. SEND ATTN ELDA DEE TO [phone] AND IF HAVE QUESTIONS ELDA FISHER CAN BE REACHED [phone]

## 2025-02-07 NOTE — TELEPHONE ENCOUNTER
The patient called to verify that she is having knee surgery on 2/27. Also needs to know how many days prior to hold xarelto.

## 2025-02-10 ENCOUNTER — TELEPHONE (OUTPATIENT)
Age: 65
End: 2025-02-10
Payer: OTHER GOVERNMENT

## 2025-02-10 NOTE — TELEPHONE ENCOUNTER
ELDA FROM [facility] RETURNED VM AND LET US KNOW THAT THE SURGERY BEING PERFORMED IS A LEFT PARTIAL KNEE REPLACEMENT ON  02/25/2025 [phone]

## 2025-02-12 ENCOUNTER — TELEPHONE (OUTPATIENT)
Age: 65
End: 2025-02-12
Payer: OTHER GOVERNMENT

## 2025-02-12 NOTE — TELEPHONE ENCOUNTER
PT IS HAVING SURGERY ON 2/25/25.  SHE NEEDS CARDIAC CLEARANCE AND DIRECTIONS ON [facility]

## 2025-02-12 NOTE — TELEPHONE ENCOUNTER
ANAMARIA FROM [facility] CALLED BACK ASKING FOR UPDATES ON THE PATIENTS CARDIAC CLEARANCE. I ADVISED THAT WE ARE WAITING ON THE PATIENTS PAPER CHART. I ADVISED TO GIVE IT A FEW DAYS AND CALL BACK AT THE BEGINNING OF NEXT WEEK. SHE OKD AND REITERATED THAT THE PATIENT IS ON XARELTO AND THAT NEEDS TO BE LOOKED INTO AS WELL. I REQUESTED THE PRACTICE MANAGER GRAB THE PAPER CHART WHEN POSSIBLE.

## 2025-03-18 DIAGNOSIS — I10 ESSENTIAL HYPERTENSION: Primary | ICD-10-CM

## 2025-03-18 RX ORDER — METOPROLOL SUCCINATE 25 MG/1
25 TABLET, EXTENDED RELEASE ORAL DAILY
Qty: 90 TABLET | Refills: 1 | Status: SHIPPED | OUTPATIENT
Start: 2025-03-18

## 2025-03-18 NOTE — TELEPHONE ENCOUNTER
Rx Refill Note  Requested Prescriptions     Pending Prescriptions Disp Refills    metoprolol succinate XL (TOPROL-XL) 25 MG 24 hr tablet 90 tablet 1     Sig: Take 1 tablet by mouth Daily.      Last office visit with prescribing clinician: 12/11/24  Last telemedicine visit with prescribing clinician: Visit date not found   Next office visit with prescribing clinician: 4/2/2025                        Would you like a call back once the refill request has been completed: [] Yes [] No [] N/A    If the office needs to give you a call back, can they leave a voicemail: [] Yes [] No [] N/A    Pharmacy Info    Last Fill Date: 9/27/24  Rx Written Date: na  Prescribed Qty: 90  Additional Details from Pharmacy: [facility] MA  03/18/25, 14:38 EDT

## 2025-03-31 RX ORDER — EMPAGLIFLOZIN 10 MG/1
10 TABLET, FILM COATED ORAL DAILY
COMMUNITY
Start: 2024-12-23 | End: 2025-04-07 | Stop reason: SDUPTHER

## 2025-03-31 NOTE — PROGRESS NOTES
Cardiology Established Patient Note     Name: Isabela Lepe  :   1960  PCP: Dominik Finnegan MD  Date:   2025  Department: [facility] CARDIOLOGY  [address]  [facility] [place] [address]  Fax [phone]  Phone [phone]    Chief Complaint:Here for my heart.    Subjective     History of Present Illness  Isabela Lepe is a 65 y.o. female who presents today for 3-month follow-up.  She is doing well, having no chest pain,SOB or palpiations. Nexlizet and ozempic are associated with severe nausea. She stopped Nexlizett but does not feel any better.  Problem list:  Hypertrophic cardiomyopathy  Cath 2024: EF 80%, atypical hypertrophic cardiomyopathy with normal coronaries, hyperdynamic LV function  Echo 2024: EF 63%, trace MR, trace TR  CAD  Cath 2021: Dyspnea, EF 80%, patent stent RCA  Hypertension  Abdominal ultrasound 2024: No AAA  Hyperlipidemia  Chronic venous insufficiency  Venous duplex 10/9/2020: Venous insufficiency in the right and left GSV's and SFJ's      Current Outpatient Medications   Medication Instructions    albuterol sulfate  (90 Base) MCG/ACT inhaler 2 puffs, Inhalation, Every 4 Hours PRN    fluticasone-salmeterol (ADVAIR) 100-50 MCG/DOSE DISKUS 1 puff, Inhalation, Daily    HYDROmorphone (DILAUDID) 2 mg, Oral, Every 3 Hours PRN    Jardiance 10 mg, Daily    lisinopril 10 MG tablet 10 mg, hydrochlorothiazide 12.5 MG capsule 12.5 mg 1 dose, Oral, Nightly    metoprolol succinate XL (TOPROL-XL) 25 mg, Oral, Daily    omeprazole (PRILOSEC) 40 mg, Oral, Daily    ondansetron (ZOFRAN) 4 MG tablet Take 1 tablet by mouth Every 8 (Eight) Hours As Needed for post-op nausea.    ranolazine (RANEXA) 500 mg, Oral, 2 Times Daily PRN                 Objective     Vital Signs:  There were no vitals taken for this visit.  Estimated body mass index is 42.25 kg/m² as calculated from the following:    Height as  "of 6/13/19: 157.5 cm (62\").    Weight as of 6/13/19: 105 kg (231 lb).       Cardiovascular:      PMI at left midclavicular line. Normal rate. Regular rhythm. Normal S1. Normal S2.       Murmurs: There is a grade 2/6 high frequency blowing holosystolic murmur at the apex.      No gallop.  No click. No rub.   Pulses:     Intact distal pulses.   Edema:     Peripheral edema absent.         Labs-1/30/25  eGFR-74.6  HbA1C 5.2    Assessment and Plan     Assessment & Plan  CAD in native artery  Coronary Artery Disease (OPTIONAL): Coronary artery disease is stable.  Continue current treatment regimen.  Cardiac status will be reassessed in 6 months.    Orders:    Hepatic Function Panel; Future    Primary hypertension  Hypertension is stable and controlled  Continue current treatment regimen.  Blood pressure will be reassessed in 6 months.    Orders:    Comprehensive Metabolic Panel; Future    Microalbumin / Creatinine Urine Ratio - Urine, Clean Catch; Future    Hyperlipidemia LDL goal <55   Needs a lipid profile    Orders:    High Sensitivity CRP; Future    Lipoprotein A (LPA); Future    Lipid Panel; Future    Nausea and vomiting, unspecified vomiting type  Due to Ozempic.  DC Ozempic.         Follow Up  No follow-ups on file.    [facility] Cardiology  "

## 2025-03-31 NOTE — TELEPHONE ENCOUNTER
Rx Refill Note  Requested Prescriptions      No prescriptions requested or ordered in this encounter      Last office visit with prescribing clinician: 12/11/24  Last telemedicine visit with prescribing clinician: Visit date not found   Next office visit with prescribing clinician: 4/2/2025                        Would you like a call back once the refill request has been completed: [] Yes [x] No [] N/A    If the office needs to give you a call back, can they leave a voicemail: [] Yes [x] No [] N/A    Pharmacy Info    Last Fill Date: NA  Rx Written Date: NA  Prescribed Qty: 90  Additional Details from Pharmacy: NA    PATIENT PASSED PROTOCOLS PER LEWIS Nicolas MA  03/31/25, 10:12 EDT

## 2025-04-02 ENCOUNTER — OFFICE VISIT (OUTPATIENT)
Age: 65
End: 2025-04-02
Payer: MEDICARE

## 2025-04-02 VITALS
HEART RATE: 81 BPM | WEIGHT: 196 LBS | DIASTOLIC BLOOD PRESSURE: 69 MMHG | BODY MASS INDEX: 36.07 KG/M2 | SYSTOLIC BLOOD PRESSURE: 127 MMHG | HEIGHT: 62 IN

## 2025-04-02 DIAGNOSIS — I25.10 CAD IN NATIVE ARTERY: Primary | ICD-10-CM

## 2025-04-02 DIAGNOSIS — I10 PRIMARY HYPERTENSION: ICD-10-CM

## 2025-04-02 DIAGNOSIS — R11.2 NAUSEA AND VOMITING, UNSPECIFIED VOMITING TYPE: ICD-10-CM

## 2025-04-02 DIAGNOSIS — E78.5 HYPERLIPIDEMIA LDL GOAL <55: ICD-10-CM

## 2025-04-02 RX ORDER — APREMILAST 30 MG/1
TABLET, FILM COATED ORAL
COMMUNITY
Start: 2025-02-04

## 2025-04-02 RX ORDER — SACUBITRIL AND VALSARTAN 49; 51 MG/1; MG/1
1 TABLET, FILM COATED ORAL 2 TIMES DAILY
COMMUNITY

## 2025-04-02 RX ORDER — BUDESONIDE, GLYCOPYRROLATE, AND FORMOTEROL FUMARATE 160; 9; 4.8 UG/1; UG/1; UG/1
2 AEROSOL, METERED RESPIRATORY (INHALATION) EVERY 12 HOURS SCHEDULED
COMMUNITY
Start: 2025-02-04

## 2025-04-02 RX ORDER — TRIAMCINOLONE ACETONIDE 1 MG/G
1 CREAM TOPICAL 2 TIMES DAILY
COMMUNITY
Start: 2025-02-04

## 2025-04-02 RX ORDER — ROSUVASTATIN CALCIUM 40 MG/1
40 TABLET, COATED ORAL NIGHTLY
COMMUNITY
Start: 2025-03-31

## 2025-04-02 RX ORDER — METHOCARBAMOL 750 MG/1
750 TABLET, FILM COATED ORAL
COMMUNITY

## 2025-04-02 RX ORDER — ASPIRIN 81 MG/1
81 TABLET ORAL DAILY
COMMUNITY

## 2025-04-02 RX ORDER — DOCUSATE SODIUM 100 MG/1
CAPSULE, LIQUID FILLED ORAL
COMMUNITY
Start: 2025-02-24

## 2025-04-02 RX ORDER — RIVAROXABAN 2.5 MG/1
2.5 TABLET, FILM COATED ORAL 2 TIMES DAILY
COMMUNITY
Start: 2025-02-25

## 2025-04-02 RX ORDER — FLUTICASONE PROPIONATE AND SALMETEROL 100; 50 UG/1; UG/1
1 POWDER RESPIRATORY (INHALATION)
COMMUNITY

## 2025-04-02 RX ORDER — BEMPEDOIC ACID AND EZETIMIBE 180; 10 MG/1; MG/1
1 TABLET, FILM COATED ORAL
COMMUNITY
End: 2025-04-02

## 2025-04-02 RX ORDER — SEMAGLUTIDE 0.68 MG/ML
0.25 INJECTION, SOLUTION SUBCUTANEOUS WEEKLY
COMMUNITY
Start: 2024-12-23 | End: 2025-04-02 | Stop reason: SINTOL

## 2025-04-02 RX ORDER — FUROSEMIDE 40 MG/1
40 TABLET ORAL DAILY PRN
COMMUNITY

## 2025-04-02 RX ORDER — MULTIVIT-MIN/IRON/FOLIC ACID/K 18-600-40
CAPSULE ORAL
COMMUNITY
Start: 2025-02-04

## 2025-04-02 RX ORDER — NITROFURANTOIN 25; 75 MG/1; MG/1
CAPSULE ORAL
COMMUNITY
Start: 2025-01-27

## 2025-04-07 DIAGNOSIS — I42.8 NICM (NONISCHEMIC CARDIOMYOPATHY): Primary | ICD-10-CM

## 2025-04-07 RX ORDER — EMPAGLIFLOZIN 10 MG/1
10 TABLET, FILM COATED ORAL DAILY
Qty: 90 TABLET | Refills: 1 | Status: SHIPPED | OUTPATIENT
Start: 2025-04-07 | End: 2025-04-09 | Stop reason: SDUPTHER

## 2025-04-07 NOTE — TELEPHONE ENCOUNTER
Rx Refill Note  JARDIANCE 10MG     Last office visit with prescribing clinician: 4/2/2025   Last telemedicine visit with prescribing clinician: Visit date not found   Next office visit with prescribing clinician: 4/16/2025                        Would you like a call back once the refill request has been completed: [] Yes [x] No [] N/A    If the office needs to give you a call back, can they leave a voicemail: [] Yes [x] No [] N/A    Pharmacy Info    Last Fill Date: NA  Rx Written Date: NA  Prescribed Qty: 90  Additional Details from Pharmacy: NELI Ling MA  04/07/25, 11:30 EDT

## 2025-04-09 DIAGNOSIS — I42.8 NICM (NONISCHEMIC CARDIOMYOPATHY): ICD-10-CM

## 2025-04-09 RX ORDER — EMPAGLIFLOZIN 10 MG/1
10 TABLET, FILM COATED ORAL DAILY
Qty: 90 TABLET | Refills: 1 | Status: SHIPPED | OUTPATIENT
Start: 2025-04-09

## 2025-04-09 NOTE — TELEPHONE ENCOUNTER
Patient medically cleared to discharge per MD. Iv and Tele removed. Discharge nurse went over discharge education with patient.    Rx Refill Note  Requested Prescriptions     Pending Prescriptions Disp Refills    Jardiance 10 MG tablet tablet 90 tablet 1     Sig: Take 1 tablet by mouth Daily.      Last office visit with prescribing clinician: 4/2/2025   Last telemedicine visit with prescribing clinician: Visit date not found   Next office visit with prescribing clinician: 4/16/2025                        Pharmacy Info    Last Fill Date:  Rx Written Date:   Prescribed Qty:   Additional Details from Pharmacy:    Patient passed protocols per kd.         Tracie Nicolas MA  04/09/25, 13:06 EDT

## 2025-04-30 ENCOUNTER — TELEPHONE (OUTPATIENT)
Age: 65
End: 2025-04-30

## 2025-04-30 NOTE — TELEPHONE ENCOUNTER
Patient had an appt today in [place] with Dr. Bacon but ended up having something come up and ended up having to leave before appt. Can you please review patient's most recent lab work and let her know how they looked.    Patient also needs an alternative to Ozempic she reports that she cannot tolerate it, causing her nausea and vomiting.       Note to self: Call patient for another appt to follow up.

## 2025-05-01 DIAGNOSIS — E78.5 HYPERLIPIDEMIA LDL GOAL <70: Primary | ICD-10-CM

## 2025-05-01 DIAGNOSIS — E11.9 TYPE 2 DIABETES MELLITUS WITHOUT COMPLICATION, UNSPECIFIED WHETHER LONG TERM INSULIN USE: Primary | ICD-10-CM

## 2025-05-01 RX ORDER — BEMPEDOIC ACID AND EZETIMIBE 180; 10 MG/1; MG/1
1 TABLET, FILM COATED ORAL DAILY
Qty: 90 TABLET | Refills: 1 | Status: SHIPPED | OUTPATIENT
Start: 2025-05-01

## 2025-05-01 NOTE — TELEPHONE ENCOUNTER
I called and discussed lab work with patient, informed her her cholesterol was elevated.  Recent and her next Tracy, will repeat in 3 months.  Patient has scheduled appointment Dr. downs on 5/9

## 2025-05-01 NOTE — TELEPHONE ENCOUNTER
"Per Alice, second sentence was supposed to say \"re-sent Nexlizet in to pharmacy, will repeat labs in 3 months.\"  "

## 2025-05-09 ENCOUNTER — OFFICE VISIT (OUTPATIENT)
Age: 65
End: 2025-05-09
Payer: MEDICARE

## 2025-05-09 VITALS
SYSTOLIC BLOOD PRESSURE: 139 MMHG | HEART RATE: 74 BPM | BODY MASS INDEX: 36.8 KG/M2 | DIASTOLIC BLOOD PRESSURE: 80 MMHG | HEIGHT: 62 IN | WEIGHT: 200 LBS

## 2025-05-09 DIAGNOSIS — I10 PRIMARY HYPERTENSION: ICD-10-CM

## 2025-05-09 DIAGNOSIS — I42.2 HYPERTROPHIC NONOBSTRUCTIVE CARDIOMYOPATHY: Primary | ICD-10-CM

## 2025-05-09 DIAGNOSIS — I87.2 CHRONIC VENOUS INSUFFICIENCY OF LOWER EXTREMITY: ICD-10-CM

## 2025-05-09 DIAGNOSIS — N18.2 STAGE 2 CHRONIC KIDNEY DISEASE: ICD-10-CM

## 2025-05-09 DIAGNOSIS — E78.41 ELEVATED LIPOPROTEIN(A): ICD-10-CM

## 2025-05-09 DIAGNOSIS — N18.30 STAGE 3 CHRONIC KIDNEY DISEASE, UNSPECIFIED WHETHER STAGE 3A OR 3B CKD: ICD-10-CM

## 2025-05-09 DIAGNOSIS — E78.5 HYPERLIPIDEMIA LDL GOAL <55: ICD-10-CM

## 2025-05-09 DIAGNOSIS — E11.65 TYPE 2 DIABETES MELLITUS WITH HYPERGLYCEMIA, WITHOUT LONG-TERM CURRENT USE OF INSULIN: ICD-10-CM

## 2025-05-09 DIAGNOSIS — E78.5 HYPERLIPIDEMIA LDL GOAL <100: ICD-10-CM

## 2025-05-09 RX ORDER — TIRZEPATIDE 2.5 MG/.5ML
2.5 INJECTION, SOLUTION SUBCUTANEOUS WEEKLY
Qty: 2 ML | Refills: 0 | Status: SHIPPED | OUTPATIENT
Start: 2025-05-09 | End: 2025-05-31

## 2025-05-09 NOTE — PROGRESS NOTES
Cardiology Established Patient Note     Name: Isabela Lepe  :   1960  PCP: Dominik Finnegan MD  Date:   2025  Department: [facility] [place] [facility] CARDIOLOGY  [address]  [facility] [address]  Fax [phone]  Phone [phone]    Chief Complaint:  Here for my diabetes    Problem list:  Hypertrophic cardiomyopathy  Cath 2024: EF 80%, apical hypertrophic cardiomyopathy with normal coronaries, hyperdynamic LV function  Echo 2024: EF 63%, trace MR, trace TR  CAD  Cath 2021: Dyspnea, EF 80%, patent stent RCA  Hypertension  Abdominal ultrasound 2024: No AAA  Hyperlipidemia  Chronic venous insufficiency  Venous duplex 10/9/2020: Venous insufficiency in the right and left GSV's and SFJ's    Subjective     History of Present Illness  Isabela Lepe is a 65 y.o. female who presents today for 4-week follow-up.  At last visit, Ozempic was stopped secondary to severe nausea.  Overall she is doing fairly well.  She has no chest pain or discomfort.  She has limited functional capacity due to knee arthritis.  Recently we took her off Ozempic at 0.5 mg secondary to severe nausea.  She was not on metformin at that time or now.  She is willing to try Mounjaro.  She brought her dated   reviewed labs 2025-   triglycerides 131(on max dose statin and nexlizet)  Glucose-fasting-134 mg/dL  Lipoprotein a 82.7 mmol/L  UACR-normal 7.7 mg/g.  C-reactive protein 4.13 mg/L.      Current Outpatient Medications   Medication Instructions    albuterol sulfate  (90 Base) MCG/ACT inhaler 2 puffs, Every 4 Hours PRN    aspirin 81 mg, Daily    Bempedoic Acid-Ezetimibe (Nexlizet) 180-10 MG tablet 1 tablet, Oral, Daily    Breztri Aerosphere 160-9-4.8 MCG/ACT aerosol inhaler 2 puffs, Every 12 Hours Scheduled    Cholecalciferol (Vitamin D) 50 MCG ( UT) capsule Vitamin D 3 Oral Tablet QTY: 0 tablet Days: 0 Refills: 0  Written: 25  "Patient Instructions:    Cholecalciferol 2,000 Units, Daily    docusate sodium (Colace) 100 MG capsule Colace 100 MG Oral Capsule QTY: 60 capsule Days: 30 Refills: 2  Written: 02/24/25 Patient Instructions: 1-2 tabs daily    Entresto 49-51 MG tablet 1 tablet, 2 Times Daily    fluticasone-salmeterol (ADVAIR) 100-50 MCG/DOSE DISKUS 1 puff, Daily    Fluticasone-Salmeterol (ADVAIR/WIXELA) 100-50 MCG/ACT DISKUS 1 puff, 2 Times Daily - RT    furosemide (LASIX) 40 mg, Daily PRN    HYDROmorphone (DILAUDID) 2 mg, Oral, Every 3 Hours PRN    Jardiance 10 mg, Oral, Daily    lisinopril 10 MG tablet 10 mg, hydrochlorothiazide 12.5 MG capsule 12.5 mg 1 dose, Nightly    methocarbamol (ROBAXIN) 750 mg    metoprolol succinate XL (TOPROL-XL) 25 mg, Oral, Daily    nitrofurantoin, macrocrystal-monohydrate, (MACROBID) 100 MG capsule Nitrofurantoin Monohyd Macro 100 MG Oral Capsule QTY: 20  Days: 10 Refills: 0  Written: 01/27/25 Patient Instructions:    omeprazole (PRILOSEC) 40 mg, Daily    ondansetron (ZOFRAN) 4 MG tablet Take 1 tablet by mouth Every 8 (Eight) Hours As Needed for post-op nausea.    Otezla 30 MG tablet Otezla 30 MG Oral Tablet QTY: 0 tablet Days: 0 Refills: 0  Written: 02/04/25 Patient Instructions:    ranolazine (RANEXA) 500 mg, 2 Times Daily PRN    rosuvastatin (CRESTOR) 40 mg, Nightly    triamcinolone (KENALOG) 0.1 % cream 1 Application, 2 Times Daily    Xarelto 2.5 mg, 2 Times Daily                 Objective     Vital Signs:  /80 (BP Location: Left arm, Patient Position: Sitting, Cuff Size: Adult)   Pulse 74   Ht 157.5 cm (62\")   Wt 90.7 kg (200 lb)   BMI 36.58 kg/m²   Estimated body mass index is 36.58 kg/m² as calculated from the following:    Height as of this encounter: 157.5 cm (62\").    Weight as of this encounter: 90.7 kg (200 lb).       Cardiovascular:      PMI at left midclavicular line. Normal rate. Regular rhythm. Normal S1. Normal S2.       Murmurs: There is no murmur.      No gallop.  No click. " No rub.   Pulses:     Intact distal pulses.   Edema:     Peripheral edema absent.             Assessment and Plan     Assessment & Plan  Hypertrophic nonobstructive cardiomyopathy  Needs limited 2 D Echo             Primary hypertension  Hypertension is stable and controlled  Continue current treatment regimen.  Blood pressure will be reassessed in 6 months.         Stage 3 chronic kidney disease, unspecified whether stage 3a or 3b CKD  Needs eGFR and UACR    Orders:    Comprehensive Metabolic Panel; Future    Microalbumin / Creatinine Urine Ratio - Urine, Clean Catch; Future    Chronic venous insufficiency of lower extremity  30 mm/hg knee high compression stockings.  Leg elevation x 30 minutes 3 times daily.  Increase your  walking schedule and distance covered.  Weight loss diet.         Hyperlipidemia LDL goal <55   Not within goal on max dose statin and next visit.  Will add Leqvio.      Orders:    Inclisiran Sodium solution prefilled syringe 284 mg    Stage 2 chronic kidney disease  Stage 2a  Therefore -no Jackie  Pt. Educated on DM and CKD.         Elevated lipoprotein(a)   Start Inclisiran         Type 2 diabetes mellitus with hyperglycemia, without long-term current use of insulin  Needs a switch to Mounjaro    Orders:    Tirzepatide (Mounjaro) 2.5 MG/0.5ML solution auto-injector; Inject 2.5 mg under the skin into the appropriate area as directed 1 (One) Time Per Week for 4 doses.      Follow Up  No follow-ups on file.    [facility]

## 2025-05-14 DIAGNOSIS — E78.5 HYPERLIPIDEMIA LDL GOAL <55: ICD-10-CM

## 2025-05-14 DIAGNOSIS — I10 PRIMARY HYPERTENSION: ICD-10-CM

## 2025-06-23 NOTE — PROGRESS NOTES
Cardiology Established Patient Note     Name: Isabela Lepe  :   1960  PCP: Dominik Finnegan MD  Date:   2025  Department: [facility] [place] [facility] CARDIOLOGY  [address]  [facility] [address]  Fax [phone]  Phone [phone]    Chief Complaint: Here for my heart   Problem list:  Hypertrophic cardiomyopathy  Cath 2024: EF 80%, apical hypertrophic cardiomyopathy with normal coronaries, hyperdynamic LV function  Echo 2024: EF 63%, trace MR, trace TR  CAD  Cath 2021: Dyspnea, EF 80%, patent stent RCA  Hypertension  Abdominal ultrasound 2024: No AAA  Hyperlipidemia  Chronic venous insufficiency  Venous duplex 10/9/2020: Venous insufficiency in the right and left GSV's and SFJ's    Subjective     History of Present Illness  Isabela Lepe is a 65 y.o. female who presents today for 4-week follow-up.  She had nausea and vomiting due to Ozempic which was stopped last visit.  Symptoms resolved.  She denies chest pain or SOB.     Recent labs-   on Crestor and Nexlizet.  Mildly elevated Lpa  Increased hsCRP    Labs-                                            Current Outpatient Medications   Medication Instructions    albuterol sulfate  (90 Base) MCG/ACT inhaler 2 puffs, Every 4 Hours PRN    aspirin 81 mg, Daily    Bempedoic Acid-Ezetimibe (Nexlizet) 180-10 MG tablet 1 tablet, Oral, Daily    Breztri Aerosphere 160-9-4.8 MCG/ACT aerosol inhaler 2 puffs, Every 12 Hours Scheduled    Cholecalciferol (Vitamin D) 50 MCG (2000) capsule Vitamin D 3 Oral Tablet QTY: 0 tablet Days: 0 Refills: 0  Written: 25 Patient Instructions:    docusate sodium (Colace) 100 MG capsule Colace 100 MG Oral Capsule QTY: 60 capsule Days: 30 Refills: 2  Written: 25 Patient Instructions: 1-2 tabs daily    Entresto 49-51 MG tablet 1 tablet, 2 Times Daily    Evolocumab (REPATHA) 140 mg, Subcutaneous, Every 14 Days     "fluticasone-salmeterol (ADVAIR) 100-50 MCG/DOSE DISKUS 1 puff, Daily    Fluticasone-Salmeterol (ADVAIR/WIXELA) 100-50 MCG/ACT DISKUS 1 puff, 2 Times Daily - RT    furosemide (LASIX) 40 mg, Daily PRN    hydrocortisone 2.5 % cream 1 Application, 2 Times Daily    HYDROmorphone (DILAUDID) 2 mg, Oral, Every 3 Hours PRN    Jardiance 10 mg, Oral, Daily    lisinopril 10 MG tablet 10 mg, hydrochlorothiazide 12.5 MG capsule 12.5 mg 1 dose, Nightly    methocarbamol (ROBAXIN) 750 mg    metoprolol succinate XL (TOPROL-XL) 25 mg, Oral, Daily    nitrofurantoin, macrocrystal-monohydrate, (MACROBID) 100 MG capsule Nitrofurantoin Monohyd Macro 100 MG Oral Capsule QTY: 20  Days: 10 Refills: 0  Written: 01/27/25 Patient Instructions:    omeprazole (PRILOSEC) 40 mg, Daily    ondansetron (ZOFRAN) 4 MG tablet Take 1 tablet by mouth Every 8 (Eight) Hours As Needed for post-op nausea.    Otezla 30 MG tablet Otezla 30 MG Oral Tablet QTY: 0 tablet Days: 0 Refills: 0  Written: 02/04/25 Patient Instructions:    ranolazine (RANEXA) 500 mg, 2 Times Daily PRN    rosuvastatin (CRESTOR) 40 mg, Nightly    triamcinolone (KENALOG) 0.1 % cream 1 Application, 2 Times Daily    Xarelto 2.5 mg, 2 Times Daily              Objective     Vital Signs:  /78 (BP Location: Right arm, Patient Position: Sitting)   Pulse 69   Ht 157.5 cm (62\")   Wt 90.7 kg (200 lb)   BMI 36.58 kg/m²   Estimated body mass index is 36.58 kg/m² as calculated from the following:    Height as of this encounter: 157.5 cm (62\").    Weight as of this encounter: 90.7 kg (200 lb).       Neck:      Vascular: No carotid bruit or JVD.   Cardiovascular:      PMI at left midclavicular line. Normal rate. Regular rhythm. Normal S1. Normal S2.       Murmurs: There is no murmur.      No gallop.  No click. No rub.   Edema:     Peripheral edema absent.   Abdominal:      General: There is no abdominal bruit.   Skin:     General: Skin is warm.   Feet:      Right foot:      Skin integrity: Skin " integrity normal. No ulcer.      Left foot:      Skin integrity: No ulcer.             Assessment and Plan     Assessment & Plan  CAD in native artery  Coronary Artery Disease (OPTIONAL): Coronary artery disease is stable.  Continue current treatment regimen.  Cardiac status will be reassessed in 6 months.         Hyperlipidemia LDL goal <55   Not within goal  Did not get approved for Inclisiran  Will start Repatha         Primary hypertension  Hypertension is stable and controlled  Continue current treatment regimen.  Blood pressure will be reassessed in 6 months.         Stage 3 chronic kidney disease, unspecified whether stage 3a or 3b CKD  Renal condition is stable.  Continue current treatment regimen.  Renal condition will be reassessed in 3 months.  Continue Statins and Crestor  Add Farxiga       Type 2 diabetes mellitus with hyperglycemia, without long-term current use of insulin  Needs A1C         Elevated lipoprotein(a)   Start Repatha           Follow Up  Return in about 4 weeks (around 7/23/2025).    [facility]

## 2025-06-24 RX ORDER — HYDROCORTISONE 25 MG/G
1 CREAM TOPICAL 2 TIMES DAILY
COMMUNITY

## 2025-06-25 ENCOUNTER — OFFICE VISIT (OUTPATIENT)
Age: 65
End: 2025-06-25
Payer: MEDICARE

## 2025-06-25 VITALS
BODY MASS INDEX: 36.8 KG/M2 | HEIGHT: 62 IN | WEIGHT: 200 LBS | DIASTOLIC BLOOD PRESSURE: 78 MMHG | HEART RATE: 69 BPM | SYSTOLIC BLOOD PRESSURE: 127 MMHG

## 2025-06-25 DIAGNOSIS — E78.5 HYPERLIPIDEMIA LDL GOAL <55: ICD-10-CM

## 2025-06-25 DIAGNOSIS — E78.41 ELEVATED LIPOPROTEIN(A): ICD-10-CM

## 2025-06-25 DIAGNOSIS — I25.10 CAD IN NATIVE ARTERY: Primary | ICD-10-CM

## 2025-06-25 DIAGNOSIS — N18.30 STAGE 3 CHRONIC KIDNEY DISEASE, UNSPECIFIED WHETHER STAGE 3A OR 3B CKD: ICD-10-CM

## 2025-06-25 DIAGNOSIS — I10 PRIMARY HYPERTENSION: ICD-10-CM

## 2025-06-25 DIAGNOSIS — E11.65 TYPE 2 DIABETES MELLITUS WITH HYPERGLYCEMIA, WITHOUT LONG-TERM CURRENT USE OF INSULIN: ICD-10-CM

## 2025-07-18 NOTE — PROGRESS NOTES
Cardiology Established Patient Note     Name: Isabela Lepe  :   1960  PCP: Dominik Finnegan MD  Date:   2025  Department: [facility] [place] [facility] CARDIOLOGY  [address]  [facility] [phone]  Fax [phone]  Phone [phone]    Chief Complaint: here for FU   Problem list:  Hypertrophic cardiomyopathy  Cath 2024: EF 80%, apical hypertrophic cardiomyopathy with normal coronaries, hyperdynamic LV function  Echo 2024: EF 63%, trace MR, trace TR  CAD  Cath 2021: Dyspnea, EF 80%, patent stent RCA  Hypertension  Abdominal ultrasound 2024: No AAA  Hyperlipidemia  Chronic venous insufficiency  Venous duplex 10/9/2020: Venous insufficiency in the right and left GSV's and SFJ's    Subjective     History of Present Illness  Isabela Lepe is a 65 y.o. female who presents today for 4-week follow-up.  Recently started on Repatha for elevated LDL and Lpa as well as on Jardiance for CKD 3.      Current Outpatient Medications   Medication Instructions    albuterol sulfate  (90 Base) MCG/ACT inhaler 2 puffs, Every 4 Hours PRN    aspirin 81 mg, Daily    Bempedoic Acid-Ezetimibe (Nexlizet) 180-10 MG tablet 1 tablet, Oral, Daily    Breztri Aerosphere 160-9-4.8 MCG/ACT aerosol inhaler 2 puffs, Every 12 Hours Scheduled    Cholecalciferol (Vitamin D) 50 MCG (2000) capsule Vitamin D 3 Oral Tablet QTY: 0 tablet Days: 0 Refills: 0  Written: 25 Patient Instructions:    Entresto 49-51 MG tablet 1 tablet, 2 Times Daily    Evolocumab (REPATHA) 140 mg, Subcutaneous, Every 14 Days    furosemide (LASIX) 40 mg, Daily PRN    hydrocortisone 2.5 % cream 1 Application, 2 Times Daily    Jardiance 10 mg, Oral, Daily    lisinopril 10 MG tablet 10 mg, hydrochlorothiazide 12.5 MG capsule 12.5 mg 1 dose, Nightly    methocarbamol (ROBAXIN) 750 mg    metoprolol succinate XL (TOPROL-XL) 25 mg, Oral, Daily    omeprazole (PRILOSEC) 40 mg, Daily  "   ondansetron (ZOFRAN) 4 MG tablet Take 1 tablet by mouth Every 8 (Eight) Hours As Needed for post-op nausea.    ranolazine (RANEXA) 500 mg, 2 Times Daily PRN    rosuvastatin (CRESTOR) 40 mg, Nightly    triamcinolone (KENALOG) 0.1 % cream 1 Application, 2 Times Daily    Xarelto 2.5 mg, 2 Times Daily        Lab Results   Component Value Date    GLUCOSE 120 (H) 06/03/2019    BUN 22 07/12/2021    CREATININE 1 07/12/2021    BCR 22 07/12/2021    K 3.2 (L) 07/12/2021    CO2 21 (L) 07/12/2021    CALCIUM 9.4 07/12/2021    ALBUMIN 4.1 07/12/2021    AST 18 07/12/2021    ALT 17 07/12/2021     Lab Results   Component Value Date    WBC 9.44 07/12/2021    RBC 4.32 07/12/2021    HGB 12.9 07/12/2021    HCT 35.2 07/12/2021    MCV 82 07/12/2021     07/12/2021         Objective     Vital Signs:  /71 (BP Location: Left arm, Patient Position: Sitting, Cuff Size: Adult)   Pulse 61   Ht 157.5 cm (62\")   Wt 90.7 kg (200 lb)   BMI 36.58 kg/m²   Estimated body mass index is 36.58 kg/m² as calculated from the following:    Height as of this encounter: 157.5 cm (62\").    Weight as of this encounter: 90.7 kg (200 lb).       Neck:      Vascular: No carotid bruit or JVD.   Cardiovascular:      PMI at left midclavicular line. Normal rate. Regular rhythm. Normal S1. Normal S2.       Murmurs: There is no murmur.      No gallop.  No click. No rub.   Edema:     Peripheral edema absent.   Abdominal:      General: There is no abdominal bruit.   Skin:     General: Skin is warm.   Feet:      Right foot:      Skin integrity: Skin integrity normal. No ulcer.      Left foot:      Skin integrity: No ulcer.             Assessment and Plan     Assessment & Plan  Hyperlipidemia LDL goal <55   Not within goal  Continue Repatha and Crestor  Orders:    Hepatic Function Panel; Future    High Sensitivity CRP; Future    Lipid Panel; Future    Type 2 diabetes mellitus with hyperglycemia, without long-term current use of insulin  Diabetes is stable. "   Continue current treatment regimen.  Diabetes will be reassessed in 6 months  Continue Jardiance  Needs A1C  Orders:    Hemoglobin A1c; Future    Stage 2 chronic kidney disease  Renal condition is stable.  Continue current treatment regimen.  Renal condition will be reassessed in 3 months.    Orders:    Comprehensive Metabolic Panel; Future    Microalbumin / Creatinine Urine Ratio - Urine, Clean Catch; Future    Elevated lipoprotein(a)   Continue Repatha    Orders:    Lipoprotein A (LPA); Future      Follow Up  Return in about 6 weeks (around 9/3/2025).    [facility]

## 2025-07-23 ENCOUNTER — OFFICE VISIT (OUTPATIENT)
Age: 65
End: 2025-07-23
Payer: MEDICARE

## 2025-07-23 VITALS
HEIGHT: 62 IN | HEART RATE: 61 BPM | SYSTOLIC BLOOD PRESSURE: 136 MMHG | DIASTOLIC BLOOD PRESSURE: 71 MMHG | BODY MASS INDEX: 36.8 KG/M2 | WEIGHT: 200 LBS

## 2025-07-23 DIAGNOSIS — N18.2 STAGE 2 CHRONIC KIDNEY DISEASE: ICD-10-CM

## 2025-07-23 DIAGNOSIS — E11.65 TYPE 2 DIABETES MELLITUS WITH HYPERGLYCEMIA, WITHOUT LONG-TERM CURRENT USE OF INSULIN: ICD-10-CM

## 2025-07-23 DIAGNOSIS — E78.41 ELEVATED LIPOPROTEIN(A): ICD-10-CM

## 2025-07-23 DIAGNOSIS — E78.5 HYPERLIPIDEMIA LDL GOAL <55: Primary | ICD-10-CM

## 2025-07-29 RX ORDER — SACUBITRIL AND VALSARTAN 49; 51 MG/1; MG/1
1 TABLET, FILM COATED ORAL 2 TIMES DAILY
Qty: 180 TABLET | Refills: 1 | Status: SHIPPED | OUTPATIENT
Start: 2025-07-29 | End: 2025-07-31 | Stop reason: SDUPTHER

## 2025-07-29 NOTE — TELEPHONE ENCOUNTER
Rx Refill Note  Requested Prescriptions     Pending Prescriptions Disp Refills    sacubitril-valsartan (Entresto) 49-51 MG tablet 180 tablet 1     Sig: Take 1 tablet by mouth 2 (Two) Times a Day.      Last office visit with prescribing clinician: 7/23/2025   Last telemedicine visit with prescribing clinician: Visit date not found   Next office visit with prescribing clinician: 9/3/2025                        Pharmacy Info    Last Fill Date:  Rx Written Date:   Prescribed Qty:   Additional Details from Pharmacy:    Patient passed protocols per kd.         Tracie Nicolas MA  07/29/25, 12:48 EDT

## 2025-07-31 RX ORDER — SACUBITRIL AND VALSARTAN 49; 51 MG/1; MG/1
1 TABLET, FILM COATED ORAL 2 TIMES DAILY
Qty: 180 TABLET | Refills: 1 | Status: SHIPPED | OUTPATIENT
Start: 2025-07-31

## 2025-07-31 NOTE — TELEPHONE ENCOUNTER
Rx Refill Note  Requested Prescriptions     Pending Prescriptions Disp Refills    sacubitril-valsartan (Entresto) 49-51 MG tablet 180 tablet 1     Sig: Take 1 tablet by mouth 2 (Two) Times a Day.      Last office visit with prescribing clinician: 7/23/2025   Last telemedicine visit with prescribing clinician: Visit date not found   Next office visit with prescribing clinician: 9/3/2025                        Pharmacy Info    Last Fill Date:  Rx Written Date:   Prescribed Qty:   Additional Details from Pharmacy:    Patient passed protocols per kd.         Tracie Nicolas MA  07/31/25, 13:21 EDT

## (undated) DEVICE — SUT ETHLN 3/0 PC5 18IN 1893G

## (undated) DEVICE — CANN PASSPORT BUTN 10MM 4CM

## (undated) DEVICE — TUBING, SUCTION, 1/4" X 10', STRAIGHT: Brand: MEDLINE

## (undated) DEVICE — BLD CUT FORMLA RESECTOR 4.0MM

## (undated) DEVICE — GOWN,NON-REINFORCED,SIRUS,SET IN SLV,XL: Brand: MEDLINE

## (undated) DEVICE — ENCORE® LATEX MICRO SIZE 6.5, STERILE LATEX POWDER-FREE SURGICAL GLOVE: Brand: ENCORE

## (undated) DEVICE — UNDERGLV SURG BIOGEL INDICAT PF 61/2 GRN

## (undated) DEVICE — NDL SPINE 18G 31/2IN PNK

## (undated) DEVICE — DRSNG PAD ABD 8X10IN STRL

## (undated) DEVICE — INTENDED FOR TISSUE SEPARATION, AND OTHER PROCEDURES THAT REQUIRE A SHARP SURGICAL BLADE TO PUNCTURE OR CUT.: Brand: BARD-PARKER ® STAINLESS STEEL BLADES

## (undated) DEVICE — 1010 S-DRAPE TOWEL DRAPE 10/BX: Brand: STERI-DRAPE™

## (undated) DEVICE — GLV SURG SENSICARE MICRO PF LF 6.5 STRL

## (undated) DEVICE — SHOULDER STABILIZATION KIT,                                    DISPOSABLE 12 PER BOX

## (undated) DEVICE — GLV SURG SENSICARE MICRO PF LF 6 STRL

## (undated) DEVICE — BUR BRL FORMLA 12FLUT 4MM

## (undated) DEVICE — 2963 MEDIPORE SOFT CLOTH TAPE 3 IN X 10 YD 12 RLS/CS: Brand: 3M™ MEDIPORE™

## (undated) DEVICE — UNDERGLV SURG BIOGEL INDICAT PI SZ8 BLU

## (undated) DEVICE — GLV SURG TRIUMPH ORTHO W/ALOE PF LTX 7.5 STRL

## (undated) DEVICE — PK MAJ SHLDR SPLT 10

## (undated) DEVICE — SYR LL 3CC

## (undated) DEVICE — PUMP PAIN AUTOFUSER AUTO SELCT NOBOLUS 1TO14ML/HR 550ML DISP

## (undated) DEVICE — DRSNG GZ PETROLTM XEROFORM CURAD 1X8IN STRL

## (undated) DEVICE — T-MAX DISPOSABLE FACE MASK 8 PER BOX

## (undated) DEVICE — TB CASSET ARTHSCP CROSSFLOW INFLOW LF

## (undated) DEVICE — 3M™ STERI-DRAPE™ U-DRAPE 1015: Brand: STERI-DRAPE™

## (undated) DEVICE — TAPE MICROFM 2IN LF

## (undated) DEVICE — SPNG GZ WOVN 4X4IN 12PLY 10/BX STRL

## (undated) DEVICE — SLNG ULTRSLING2 15IN PLS XL

## (undated) DEVICE — GLV SURG SENSICARE MICRO PF LF 7.5 STRL

## (undated) DEVICE — NDL FLTR BLNT 18G 1 1/2IN